# Patient Record
Sex: MALE | Race: WHITE | NOT HISPANIC OR LATINO | Employment: UNEMPLOYED | URBAN - METROPOLITAN AREA
[De-identification: names, ages, dates, MRNs, and addresses within clinical notes are randomized per-mention and may not be internally consistent; named-entity substitution may affect disease eponyms.]

---

## 2022-01-01 ENCOUNTER — TELEPHONE (OUTPATIENT)
Dept: GASTROENTEROLOGY | Facility: CLINIC | Age: 37
End: 2022-01-01

## 2022-01-01 ENCOUNTER — TRANSFERRED RECORDS (OUTPATIENT)
Dept: HEALTH INFORMATION MANAGEMENT | Facility: CLINIC | Age: 37
End: 2022-01-01

## 2022-01-01 ENCOUNTER — REFERRAL (OUTPATIENT)
Dept: TRANSPLANT | Facility: CLINIC | Age: 37
End: 2022-01-01

## 2022-01-01 ENCOUNTER — DOCUMENTATION ONLY (OUTPATIENT)
Dept: TRANSPLANT | Facility: CLINIC | Age: 37
End: 2022-01-01

## 2022-01-01 ENCOUNTER — MEDICAL CORRESPONDENCE (OUTPATIENT)
Dept: HEALTH INFORMATION MANAGEMENT | Facility: CLINIC | Age: 37
End: 2022-01-01

## 2022-01-01 DIAGNOSIS — K70.31 ALCOHOLIC CIRRHOSIS OF LIVER WITH ASCITES (H): Primary | ICD-10-CM

## 2022-06-30 NOTE — TELEPHONE ENCOUNTER
Patient and his Mother Lucia returning call to the .The patient did give consent for his Mother to get and give information in his absent. Patient will be going to dialysis at 12:30 pm today.

## 2022-07-01 NOTE — TELEPHONE ENCOUNTER
Patient's mother Lucia was asked the following questions during liver intake call. Verbal permission was given by patient to speak with his mother Lucia regarding his medical history and health care.    [facility]  Referring Diagnosis: Dx: ETOH cirrhosis w/ascites  GFR: HD 2022- possible IgA nephropathy    PCP: assigned PCP Wen Briscoe, going to establish care with her    1)Do you know why you have liver disease: Not asked       If Alcoholic Cirrhosis is present when was your last drink: 2022 as reported by his mother. He went to check himself into [facility] for treatment for alcohol addiction and was admitted to medical floor.        Have you ever been through treatment for alcohol: no  2) Presence of Ascites: yes Paracentesis: yes  3) Presence of Hepatic Encephalopathy:  mother Lucia unsure Medications:   4) History of GI Bleedin) Oxygen Use: no  6) EGD: no report found, one was being scheduled  7) Colonoscopy: no report found   8) MELD Score: 31  on 22  9) Labs available for review from PCP/GI: scanned  10)HCC Diagnosis: no                                  11)Insurance information:   [place]icaid [place]             Policy [place]: self             Subscriber/Policy/ID Number: [id number]             Group Number: none     Patient's mother stated she thought he also was  covered by [facility]. He did not have a card they go by his  social security number    Referral intake process completed.  Patient is aware that after financial approval is received, medical records will be requested.   Patient confirmed for a callback from transplant coordinator on .  Tentative evaluation date TBD.    Confirmed coordinator will discuss evaluation process in more detail at the time of their call.   Patient is aware of the need to arrange age appropriate cancer screening, vaccinations, and dental care.  Reminded patient to complete questionnaire, complete  medical records release, and review packet prior to evaluation visit .  Assessed patient for special needs (ie--wheelchair, assistance, guardian, and ):  weak from hospitalization-would require a wheelchair.   Patient instructed to call [phone] with questions.     Patient gave verbal consent during intake call to obtain medical records and documents outside of [facility]:  yes

## 2022-07-02 NOTE — TELEPHONE ENCOUNTER
Spoke with GI PA at [facility] 7/1/2022.    Patient with decomp ETOH cirrhosis complicated with ascites, HE admitted with septic shock secondary to c.diff colitis.  MELD-Na 31 at peak.    Septic shock resolved.  Current MELD-Na not available.    Patient debilitated from recent illness and now having difficulty eating.  Mobility poor.    No urgent indication for transfer for inpatient LT evaluation- patient needs time to recover from recent severe acute illness and improve functional status.  Will probably need TCU.    Patient has already been referred for liver transplant evaluation.      Oz Warren MD  Hepatology

## 2022-07-06 NOTE — TELEPHONE ENCOUNTER
Tried calling cell phone, LVM, calling mother's phone now    Referred by Dr. Alo Pollard at [facility]    Spoke with mother just now who stated patient passed away on Sunday night while admitted.    Offered our condolences and will close out referral